# Patient Record
Sex: MALE | Race: OTHER | HISPANIC OR LATINO
[De-identification: names, ages, dates, MRNs, and addresses within clinical notes are randomized per-mention and may not be internally consistent; named-entity substitution may affect disease eponyms.]

---

## 2024-06-07 PROBLEM — Z00.00 ENCOUNTER FOR PREVENTIVE HEALTH EXAMINATION: Status: ACTIVE | Noted: 2024-06-07

## 2024-06-10 ENCOUNTER — APPOINTMENT (OUTPATIENT)
Dept: SURGERY | Facility: CLINIC | Age: 29
End: 2024-06-10
Payer: SELF-PAY

## 2024-06-10 VITALS — DIASTOLIC BLOOD PRESSURE: 71 MMHG | HEART RATE: 64 BPM | OXYGEN SATURATION: 99 % | SYSTOLIC BLOOD PRESSURE: 122 MMHG

## 2024-06-10 DIAGNOSIS — F17.200 NICOTINE DEPENDENCE, UNSPECIFIED, UNCOMPLICATED: ICD-10-CM

## 2024-06-10 DIAGNOSIS — M79.89 OTHER SPECIFIED SOFT TISSUE DISORDERS: ICD-10-CM

## 2024-06-10 PROCEDURE — 99203 OFFICE O/P NEW LOW 30 MIN: CPT

## 2024-06-10 NOTE — HISTORY OF PRESENT ILLNESS
[de-identified] : This is a 29 year  old patient who  is presenting with the chief complaint of having  right anterior leg mass.  He reports having this condition for  few months. He denies any trauma to the area.   He denies any fever or  night sweats. Appetite is good and weight is stable.  He  is asymptomatic and the mass has been the same in size since he noticed it last.  He wants to know if it could  be surgically  removed.

## 2024-06-10 NOTE — PLAN
[FreeTextEntry1] : Mr. MONTANA  was told significance of findings, options, risks and benefits were explained.   All surgical options were discussed including non-surgical treatments.  he has a very small  right anterior leg mass . he was advised to observe the condition and return if the mass gets larger or symptomatic   Patient advised to seek immediate medical attention with any acute change in symptoms or with the development of any new or worsening symptoms.  Patient's questions and concerns addressed to patient's satisfaction, and patient verbalized an understanding of the information discussed.